# Patient Record
Sex: FEMALE | URBAN - METROPOLITAN AREA
[De-identification: names, ages, dates, MRNs, and addresses within clinical notes are randomized per-mention and may not be internally consistent; named-entity substitution may affect disease eponyms.]

---

## 2024-11-22 ENCOUNTER — APPOINTMENT (RX ONLY)
Dept: URBAN - METROPOLITAN AREA CLINIC 141 | Facility: CLINIC | Age: 39
Setting detail: DERMATOLOGY
End: 2024-11-22

## 2024-11-22 DIAGNOSIS — L81.4 OTHER MELANIN HYPERPIGMENTATION: ICD-10-CM

## 2024-11-22 DIAGNOSIS — L72.0 EPIDERMAL CYST: ICD-10-CM

## 2024-11-22 PROBLEM — D23.61 OTHER BENIGN NEOPLASM OF SKIN OF RIGHT UPPER LIMB, INCLUDING SHOULDER: Status: ACTIVE | Noted: 2024-11-22

## 2024-11-22 PROCEDURE — ? CPT CODE GENERATOR

## 2024-11-22 PROCEDURE — ? CONSULTATION EXCISION

## 2024-11-22 PROCEDURE — 99203 OFFICE O/P NEW LOW 30 MIN: CPT

## 2024-11-22 PROCEDURE — ? COUNSELING

## 2024-11-22 ASSESSMENT — LOCATION DETAILED DESCRIPTION DERM
LOCATION DETAILED: LEFT LATERAL TEMPLE
LOCATION DETAILED: RIGHT POSTERIOR AXILLA

## 2024-11-22 ASSESSMENT — LOCATION ZONE DERM
LOCATION ZONE: FACE
LOCATION ZONE: AXILLAE

## 2024-11-22 ASSESSMENT — LOCATION SIMPLE DESCRIPTION DERM
LOCATION SIMPLE: RIGHT POSTERIOR AXILLA
LOCATION SIMPLE: LEFT TEMPLE

## 2024-11-22 NOTE — PROCEDURE: CPT CODE GENERATOR
58528 Price: 0.00
Show Excision Codes?: No
Repair: Intermediate Primary Closure
Additional J Code Units: 1
Anticipated Depth And Size Of Soft Tissue Excision (Required): Subcutaneous, Less than 1.5 cm
Which Photosensitizer Was Used?: Levulan
Number Of Lesions Injected: Less than 8 lesions
Location (Required): Face
How Many Lesions Are You Destroying?: Less than 15
Fee Schedule Discount For Cash Pay Patients In % Off Of Fee Schedule: 0
First Biopsy Type: Tangential Biopsy
Anticipated Excised Diameter (Required): 1.1 - 2.0 cm
Include Pathology Charges?: Yes
Anticipated Depth And Size Of Soft Tissue Excision (Required): Subcutaneous, Less than 2 cm
Fee Schedule Discount In % Off Of Fee Schedule: Standard Fee Schedule as Entered in Pricing Tab
Anticipated Wound Length (Required): 2.5 cm or less
Detail Level: None
Anticipated Depth And Size Of Soft Tissue Excision (Required): Subcutaneous, Less than 3 cm
First Procedure You Would Like A Quote For?: Benign Excision